# Patient Record
Sex: FEMALE | HISPANIC OR LATINO | Employment: UNEMPLOYED | URBAN - METROPOLITAN AREA
[De-identification: names, ages, dates, MRNs, and addresses within clinical notes are randomized per-mention and may not be internally consistent; named-entity substitution may affect disease eponyms.]

---

## 2024-08-12 ENCOUNTER — TELEPHONE (OUTPATIENT)
Dept: URGENT CARE | Facility: CLINIC | Age: 34
End: 2024-08-12
Payer: MEDICAID

## 2024-08-12 ENCOUNTER — TELEPHONE (OUTPATIENT)
Dept: ORTHOPEDIC SURGERY | Facility: CLINIC | Age: 34
End: 2024-08-12

## 2024-08-12 NOTE — TELEPHONE ENCOUNTER
Used the  line to notify patient of the radiology report. Informed of the fracture at the base of the 5th metatarsal that has several fragment fractures. Reaffirmed that she should not be bearing weight on that foot and to follow up with orthopedics.    Pt stated she has an appointment with orthopedics tomorrow, August 8th. Also stated she has been careful and not bearing weight on her RT foot.         Fransisco Cisneros PA-C P [facility]  Fracture noted at the base of the fifth metatarsal with several fragment fractures.  Patient is to follow-up with orthopedics as instructed.  Patient is to continue nonweightbearing.

## 2024-08-12 NOTE — TELEPHONE ENCOUNTER
Caller: YAHAIRA VALENTINE  Relationship to patient: SELF  Best call back number: [phone]   Service:Icelandic  Is  needs updated in registration: YES  Date of Appointment:8-13-24  Time of Appointment:9:40  Additional notes: PATIENT NEEDS  FOR URGENT REFERRAL APPOINTMENT

## 2024-08-13 ENCOUNTER — OFFICE VISIT (OUTPATIENT)
Dept: ORTHOPEDIC SURGERY | Facility: CLINIC | Age: 34
End: 2024-08-13
Payer: MEDICAID

## 2024-08-13 VITALS — WEIGHT: 185 LBS | HEIGHT: 67 IN | BODY MASS INDEX: 29.03 KG/M2 | TEMPERATURE: 98.6 F

## 2024-08-13 DIAGNOSIS — S92.351A DISPLACED FRACTURE OF FIFTH METATARSAL BONE, RIGHT FOOT, INITIAL ENCOUNTER FOR CLOSED FRACTURE: Primary | ICD-10-CM

## 2024-08-13 PROCEDURE — 99204 OFFICE O/P NEW MOD 45 MIN: CPT | Performed by: ORTHOPAEDIC SURGERY

## 2024-08-13 PROCEDURE — 1160F RVW MEDS BY RX/DR IN RCRD: CPT | Performed by: ORTHOPAEDIC SURGERY

## 2024-08-13 PROCEDURE — 1159F MED LIST DOCD IN RCRD: CPT | Performed by: ORTHOPAEDIC SURGERY

## 2024-08-13 NOTE — PROGRESS NOTES
"General Exam    Patient: Lainey Chang    YOB: 1990    Medical Record Number: [id number]    Chief Complaints: Right foot fracture    History of Present Illness:     33 y.o. female patient who presents for evaluation right foot fracture.  Patient states several days ago she stepped funny and had foot pain laterally.  She was seen emergency department told she had a foot fracture given crutches and a boot told to follow-up orthopedics.  She states the boot does help but feels heavy.  No previous issues with her foot.    Patient requires   ID [id number]    Denies any numbness or tingling.  Denies any fevers, cough or shortness of breath.    Allergies: No Known Allergies    Home Medications:      Current Outpatient Medications:     acetaminophen-codeine (TYLENOL with CODEINE #3) 300-30 MG per tablet, Take 1 tablet by mouth Every 6 (Six) Hours As Needed for Moderate Pain., Disp: 25 tablet, Rfl: 0    History reviewed. No pertinent past medical history.    Past Surgical History:   Procedure Laterality Date    BREAST SURGERY         Social History     Occupational History    Not on file   Tobacco Use    Smoking status: Never    Smokeless tobacco: Not on file   Vaping Use    Vaping status: Some Days    Substances: Flavoring    Devices: Disposable   Substance and Sexual Activity    Alcohol use: Yes    Drug use: Never    Sexual activity: Defer      Social History     Social History Narrative    Not on file       History reviewed. No pertinent family history.    Review of Systems:      Constitutional: Denies fever, shaking or chills         All other pertinent positives and negatives as noted above in HPI.    Physical Exam: 33 y.o. female    Vitals:    08/13/24 0953   Temp: 98.6 °F (37 °C)   TempSrc: Temporal   Weight: 83.9 kg (185 lb)   Height: 170 cm (66.93\")       General:  Patient is awake and alert.  Appears in no acute distress or " discomfort.      Musculoskeletal/Extremities:    Right foot examined some swelling tenderness laterally.  Compartment soft and compressible.  Motor and sensory intact distally.         Radiology:       Previous films reviewed to evaluate the patient's complaint/s.    Imaging shows displaced fifth metatarsal base fracture appears to be in zone 1.     No imaging for comparison.    Assessment: Right fifth metatarsal base fracture      Plan:      I discussed the finds with the patient plan conservative treatment this time however depend on fracture healing surgical intervention could be warranted.  At this time she will be protected weightbearing in her boot with crutches.  Wear boot full-time over the next couple weeks but may remove it during the day to ice and elevate and shower.  Recommend vitamin D for bone healing health.  Tylenol for pain.  She will follow-up in 3 weeks.  She is doing well in 3 weeks she will be able to add a little more weight and just use 1 crutch or cane.  Plan for boot for 6 weeks then transition to a postop shoe.           We will plan for follow up 3 weeks.    All questions were answered.  Patient understands and agrees with the plan.    Jaiden Forbes MD    08/13/2024    CC to Provider, No Known

## 2024-09-05 ENCOUNTER — OFFICE VISIT (OUTPATIENT)
Dept: ORTHOPEDIC SURGERY | Facility: CLINIC | Age: 34
End: 2024-09-05
Payer: MEDICAID

## 2024-09-05 VITALS — WEIGHT: 188.2 LBS | BODY MASS INDEX: 29.54 KG/M2 | TEMPERATURE: 97.8 F | HEIGHT: 67 IN

## 2024-09-05 DIAGNOSIS — R52 PAIN: ICD-10-CM

## 2024-09-05 DIAGNOSIS — S92.351D CLOSED DISPLACED FRACTURE OF FIFTH METATARSAL BONE OF RIGHT FOOT WITH ROUTINE HEALING, SUBSEQUENT ENCOUNTER: Primary | ICD-10-CM

## 2024-09-05 NOTE — PROGRESS NOTES
"Patient: Lainey Chang  YOB: 1990 33 y.o. female  Medical Record Number: [id number]    Chief Complaints:   Chief Complaint   Patient presents with    Right Foot - Follow-up, Pain       History of Present Illness:Lainey Chang is a 33 y.o. female who presents for follow-up of right fifth metatarsal fracture.  She is now almost 4 weeks out since injury.  She has been in a boot and using crutches she feels she has been a low bit more weight on been wearing the boot most full-time.  Does not really have any pain on the lateral part of the foot more so about the ankle but has not been moving it much.  Some mild swelling noted.    Patient is Icelandic-speaking.   required ID number [id number]    Allergies: No Known Allergies    Medications:   Current Outpatient Medications   Medication Sig Dispense Refill    acetaminophen-codeine (TYLENOL with CODEINE #3) 300-30 MG per tablet Take 1 tablet by mouth Every 6 (Six) Hours As Needed for Moderate Pain. 25 tablet 0     No current facility-administered medications for this visit.         The following portions of the patient's history were reviewed and updated as appropriate: allergies, current medications, past family history, past medical history, past social history, past surgical history and problem list.    Review of Systems:   A 14 point review of systems was performed. All systems negative except pertinent positives/negative listed in HPI above    Physical Exam:   Vitals:    09/05/24 1050   Temp: 97.8 °F (36.6 °C)   TempSrc: Temporal   Weight: 85.4 kg (188 lb 3.2 oz)   Height: 170.2 cm (67\")   PainSc:   2   PainLoc: Foot       General: A and O x 3, ASA, NAD    SCLERA:    Normal    DENTITION:   Normal  Right foot examined mild swelling about the ankle and foot.  No overt tenderness about the foot or ankle.  Motor and sensory intact distally.  Compartment soft compressible.    Radiology: 3 views right foot AP, lateral and oblique taken " and reviewed show fifth metatarsal base fracture overall alignment is satisfactory.  No significant changes from previous films.    Assessment/Plan:  3-week status post right fifth metatarsal fracture    Patient is improving.  X-rays show as noted above.  Plan continue conservative treatment.  Continue the boot whenever up.  Can wean out of it while sleeping.  Ice elevate during the day and do ankle pumps.  Can wean off one of the crutches as weightbearing allows.  Continue vitamin D.  Continue Tylenol for pain control.  Patient will follow-up in 3 weeks.  Depend on how she is doing may transition her into a postop shoe as well as some therapy if warranted.  Patient is understanding agreeable to this plan.

## 2024-09-26 ENCOUNTER — OFFICE VISIT (OUTPATIENT)
Dept: ORTHOPEDIC SURGERY | Facility: CLINIC | Age: 34
End: 2024-09-26
Payer: MEDICAID

## 2024-09-26 VITALS — TEMPERATURE: 98.2 F | BODY MASS INDEX: 29.63 KG/M2 | WEIGHT: 188.8 LBS | HEIGHT: 67 IN

## 2024-09-26 DIAGNOSIS — S92.351D CLOSED DISPLACED FRACTURE OF FIFTH METATARSAL BONE OF RIGHT FOOT WITH ROUTINE HEALING, SUBSEQUENT ENCOUNTER: Primary | ICD-10-CM

## 2024-09-26 DIAGNOSIS — Z09 FOLLOW-UP EXAM: ICD-10-CM

## 2024-10-10 ENCOUNTER — OFFICE VISIT (OUTPATIENT)
Dept: ORTHOPEDIC SURGERY | Facility: CLINIC | Age: 34
End: 2024-10-10
Payer: MEDICAID

## 2024-10-10 VITALS — HEIGHT: 67 IN | WEIGHT: 189.2 LBS | BODY MASS INDEX: 29.7 KG/M2 | TEMPERATURE: 98.4 F

## 2024-10-10 DIAGNOSIS — S92.351D CLOSED DISPLACED FRACTURE OF FIFTH METATARSAL BONE OF RIGHT FOOT WITH ROUTINE HEALING, SUBSEQUENT ENCOUNTER: Primary | ICD-10-CM

## 2024-10-10 PROCEDURE — 1159F MED LIST DOCD IN RCRD: CPT | Performed by: ORTHOPAEDIC SURGERY

## 2024-10-10 PROCEDURE — 99213 OFFICE O/P EST LOW 20 MIN: CPT | Performed by: ORTHOPAEDIC SURGERY

## 2024-10-10 PROCEDURE — 1160F RVW MEDS BY RX/DR IN RCRD: CPT | Performed by: ORTHOPAEDIC SURGERY

## 2024-10-10 NOTE — PROGRESS NOTES
"Patient: Lainey Chang  YOB: 1990 33 y.o. female  Medical Record Number: [id number]    Chief Complaints:   Chief Complaint   Patient presents with    Right Foot - Follow-up, Pain       History of Present Illness:Lainey Chang is a 33 y.o. female who presents for follow-up of right fifth metatarsal fracture.  Patient states her symptoms are much improved only has some slight discomfort if she leans on of the outside part of her foot.  Overall she feels better would like to see about getting to work.   available on iPad ID number [id number].    Allergies: No Known Allergies    Medications:   Current Outpatient Medications   Medication Sig Dispense Refill    acetaminophen-codeine (TYLENOL with CODEINE #3) 300-30 MG per tablet Take 1 tablet by mouth Every 6 (Six) Hours As Needed for Moderate Pain. 25 tablet 0    vitamin D3 125 MCG (5000 UT) capsule capsule Take 1 capsule by mouth Daily.       No current facility-administered medications for this visit.         The following portions of the patient's history were reviewed and updated as appropriate: allergies, current medications, past family history, past medical history, past social history, past surgical history and problem list.    Review of Systems:   A 14 point review of systems was performed. All systems negative except pertinent positives/negative listed in HPI above    Physical Exam:   Vitals:    10/10/24 1119   Temp: 98.4 °F (36.9 °C)   TempSrc: Temporal   Weight: 85.8 kg (189 lb 3.2 oz)   Height: 170.2 cm (67.01\")   PainSc:   1   PainLoc: Foot       General: A and O x 3, ASA, NAD    SCLERA:    Normal    DENTITION:   Normal  Right foot examined no overt tenderness palpation about the foot.  Gentle ankle range of motion tolerated.    Radiology:      3 views right foot AP lateral and oblique taken reviewed imaging shows interval healing of fifth metatarsal base fracture overall alignment remains satisfactory no other " significant changes in previous films.    Assessment/Plan:  Status post right fifth metatarsal base fracture    Patient appears to be progressing.  She really to get back to work feels that she can and she can limit some of her activities if needed.  I told her she could try to go back to see how things go just allow some rest elevation appropriate shoe wear.  Like to plan to see her back in 3 weeks to make sure she is progressing well.  She is understanding of this plan.  All questions answered.